# Patient Record
Sex: FEMALE
[De-identification: names, ages, dates, MRNs, and addresses within clinical notes are randomized per-mention and may not be internally consistent; named-entity substitution may affect disease eponyms.]

---

## 2018-12-29 ENCOUNTER — HOSPITAL ENCOUNTER (EMERGENCY)
Dept: HOSPITAL 14 - H.ER | Age: 48
Discharge: HOME | End: 2018-12-29
Payer: COMMERCIAL

## 2018-12-29 VITALS
TEMPERATURE: 97.6 F | HEART RATE: 76 BPM | SYSTOLIC BLOOD PRESSURE: 118 MMHG | OXYGEN SATURATION: 98 % | DIASTOLIC BLOOD PRESSURE: 76 MMHG

## 2018-12-29 VITALS — BODY MASS INDEX: 26.6 KG/M2

## 2018-12-29 VITALS — RESPIRATION RATE: 19 BRPM

## 2018-12-29 DIAGNOSIS — E03.9: ICD-10-CM

## 2018-12-29 DIAGNOSIS — R10.32: Primary | ICD-10-CM

## 2018-12-29 DIAGNOSIS — K52.9: ICD-10-CM

## 2018-12-29 LAB
ALBUMIN SERPL-MCNC: 4.3 G/DL (ref 3.5–5)
ALBUMIN/GLOB SERPL: 1.2 {RATIO} (ref 1–2.1)
ALT SERPL-CCNC: 15 U/L (ref 9–52)
AST SERPL-CCNC: 23 U/L (ref 14–36)
BASOPHILS # BLD AUTO: 0 K/UL (ref 0–0.2)
BASOPHILS NFR BLD: 1 % (ref 0–2)
BUN SERPL-MCNC: 11 MG/DL (ref 7–17)
CALCIUM SERPL-MCNC: 9.8 MG/DL (ref 8.4–10.2)
EOSINOPHIL # BLD AUTO: 0 K/UL (ref 0–0.7)
EOSINOPHIL NFR BLD: 0.5 % (ref 0–4)
ERYTHROCYTE [DISTWIDTH] IN BLOOD BY AUTOMATED COUNT: 14.5 % (ref 11.5–14.5)
GFR NON-AFRICAN AMERICAN: > 60
HGB BLD-MCNC: 13.1 G/DL (ref 12–16)
LYMPHOCYTES # BLD AUTO: 1.5 K/UL (ref 1–4.3)
LYMPHOCYTES NFR BLD AUTO: 35.2 % (ref 20–40)
MCH RBC QN AUTO: 30 PG (ref 27–31)
MCHC RBC AUTO-ENTMCNC: 32.2 G/DL (ref 33–37)
MCV RBC AUTO: 93.1 FL (ref 81–99)
MONOCYTES # BLD: 0.4 K/UL (ref 0–0.8)
MONOCYTES NFR BLD: 9.2 % (ref 0–10)
NEUTROPHILS # BLD: 2.3 K/UL (ref 1.8–7)
NEUTROPHILS NFR BLD AUTO: 54.1 % (ref 50–75)
NRBC BLD AUTO-RTO: 0.1 % (ref 0–0)
PLATELET # BLD: 164 K/UL (ref 130–400)
PMV BLD AUTO: 9.7 FL (ref 7.2–11.7)
RBC # BLD AUTO: 4.37 MIL/UL (ref 3.8–5.2)
WBC # BLD AUTO: 4.3 K/UL (ref 4.8–10.8)

## 2018-12-29 PROCEDURE — 85025 COMPLETE CBC W/AUTO DIFF WBC: CPT

## 2018-12-29 PROCEDURE — 76830 TRANSVAGINAL US NON-OB: CPT

## 2018-12-29 PROCEDURE — 74177 CT ABD & PELVIS W/CONTRAST: CPT

## 2018-12-29 PROCEDURE — 81025 URINE PREGNANCY TEST: CPT

## 2018-12-29 PROCEDURE — 80053 COMPREHEN METABOLIC PANEL: CPT

## 2018-12-29 PROCEDURE — 99284 EMERGENCY DEPT VISIT MOD MDM: CPT

## 2018-12-29 NOTE — US
Date of service: 



12/29/2018



HISTORY:

LLQ pain



COMPARISON:

None available.



TECHNIQUE:

Transabdominal/transvaginal sonographic evaluation of pelvis 

performed.



FINDINGS:



UTERUS:

Measures 5.6 x 2.9 x 2.3 cm. Normal in size and appearance. No 

fibroid or other mass lesion seen.



ENDOMETRIUM:

Measures 2.1 mm in diameter. Unremarkable. 



CERVIX:

No cervical abnormality identified.  Cervix measures 1.8 cm 



RIGHT OVARY:

Measures 2.3 x 1.9 x 1.0 cm. No solid mass. Normal flow. 



LEFT OVARY:

Measures 2.0 x 1.8 x 1.0 cm. No solid mass. Normal flow. 



FREE FLUID:

No significant free fluid noted.



OTHER FINDINGS:

None. 



IMPRESSION:

Unremarkable pelvic ultrasound.

## 2018-12-29 NOTE — ED PDOC
HPI: Abdomen


Time Seen by Provider: 12/29/18 11:16


Chief Complaint (Nursing): Abdominal Pain


Chief Complaint (Provider): Abdominal Pain


History Per: Patient


History/Exam Limitations: no limitations


Onset/Duration Of Symptoms: Days (x1)


Current Symptoms Are (Timing): Still Present


Quality Of Discomfort: Sharp


Additional Complaint(s): 


48 year old female presents to the ED for evaluation of sharp LLQ abdominal pain

since yesterday. Denies any associated nausea, vomiting, diarrhea, urinary 

symptoms, and fever.





LNMP: 2015


PMD: none provided





Past Medical History


Reviewed: Historical Data, Nursing Documentation, Vital Signs


Vital Signs: 





                                Last Vital Signs











Temp  98 F   12/29/18 11:09


 


Pulse  82   12/29/18 11:09


 


Resp  16   12/29/18 11:09


 


BP  124/80   12/29/18 11:09


 


Pulse Ox  100   12/29/18 11:09














- Medical History


PMH: Hypothyroidism





- Surgical History


Surgical History: No Surg Hx





- Family History


Family History: States: Unknown Family Hx





- Allergies


Allergies/Adverse Reactions: 


                                    Allergies











Allergy/AdvReac Type Severity Reaction Status Date / Time


 


No Known Allergies Allergy   Verified 12/29/18 11:32














Review of Systems


ROS Statement: Except As Marked, All Systems Reviewed And Found Negative


Constitutional: Negative for: Fever


Gastrointestinal: Positive for: Abdominal Pain (LLQ).  Negative for: Nausea, 

Vomiting, Diarrhea


Genitourinary Female: Negative for: Dysuria, Frequency, Incontinence





Physical Exam





- Reviewed


Nursing Documentation Reviewed: Yes


Vital Signs Reviewed: Yes





- Physical Exam


Appears: Positive for: No Acute Distress


Head Exam: Positive for: ATRAUMATIC, NORMOCEPHALIC


Skin: Positive for: Normal Color, Warm


Eye Exam: Positive for: Normal appearance


Neck: Positive for: Normal, Painless ROM, Supple


Cardiovascular/Chest: Positive for: Regular Rate, Rhythm


Respiratory: Positive for: Normal Breath Sounds.  Negative for: Respiratory 

Distress


Gastrointestinal/Abdominal: Positive for: Normal Exam, Soft, Tenderness (LLQ).  

Negative for: Mass


Back: Positive for: Normal Inspection.  Negative for: L CVA Tenderness, R CVA 

Tenderness


Extremity: Positive for: Normal ROM


Neurologic/Psych: Positive for: Alert, Oriented (x3)





- Laboratory Results


Result Diagrams: 


                                 12/29/18 12:00





                                 12/29/18 12:00





- ECG


O2 Sat by Pulse Oximetry: 100 (RA)


Pulse Ox Interpretation: Normal





Medical Decision Making


Medical Decision Making: 


Time: 1135


Initial Impression: LLQ pain, does not appear to be GI, r/o ovarian cyst


Initial Plan:


--CMP


--U-preg


--U-dip


--CBC with differential


--Transvag US





-----------------------------------------

-------------------------------------------------------------------------------


Scribe Attestation:


Documented by Mary Fulton, acting as a scribe for Mj Holman MD.


Provider Scribe Attestation:


All medical record entries made by the Scribe were at my direction and 

personally dictated by me. I have reviewed the chart and agree that the record 

accurately reflects my personal performance of the history, physical exam, 

medical decision making, and the department course for this patient. I have also

personally directed, reviewed, and agree with the discharge instructions and 

disposition.





Disposition





- Clinical Impression


Clinical Impression: 


 Abdominal pain








- Patient ED Disposition


Is Patient to be Admitted: Transfer of Care





- Disposition


Disposition: Transfer of Care


Disposition Time: 14:52


Condition: FAIR


Forms:  [employer] (English)


Patient Signed Over To: Wendy Perez

## 2018-12-29 NOTE — CT
Date of service: 



12/29/2018



PROCEDURE:  CT Abdomen and Pelvis 



HISTORY:

Abd pain



COMPARISON:

None.



TECHNIQUE:

Contiguous axial images of the abdomen and pelvis performed following 

intravenous injection of approximately 95 cc Omnipaque 300 contrast 

material.  Additional 2D sagittal and coronal reformats generated. 



Radiation dose:



Total exam DLP = 566.72 mGy-cm.



This CT exam was performed using one or more of the following dose 

reduction techniques: Automated exposure control, adjustment of the 

mA and/or kV according to patient size, and/or use of iterative 

reconstruction technique.



FINDINGS:



LOWER THORAX:

Lung bases clear.  Small hiatal hernia.



LIVER:

Liver exhibits normal size measuring approximately 14 cm in CC 

dimension.  No obvious hepatic mass collection or calcification. 



Minor fatty hepatic infiltration. 



GALLBLADDER AND BILE DUCTS:

Unremarkable. 



PANCREAS:

Unremarkable. No mass. No ductal dilatation.



SPLEEN:

Unremarkable. No splenomegaly. 



ADRENALS:

Unremarkable. 



KIDNEYS AND URETERS:

Unremarkable. No stone or hydronephrosis. 



BLADDER:

Urinary bladder incompletely distended which in part accounts for 

thick-walled appearance however the possibility of cystitis/UTI not 

excluded.  Clinical correlation with urinalysis. 



REPRODUCTIVE:

Unremarkable. 



APPENDIX:

No evidence of acute appendicitis.



BOWEL:

Evaluation of the bowel is somewhat limited due to of the lack of 

oral contrast material.



Stomach is incompletely distended with thick-walled appearance. 



Visualized loops of small bowel exhibit normal contour and caliber.  

No evidence of acute mechanical small bowel obstruction. 



Stool and air seen throughout the cecum ascending and some of the 

transverse colon.  Portions of the descending colon are collapsed 

which likely in part accounts for prominent appearing wall however 

some submucosal fat deposition or edema not completely excluded..  

There is a localized area infiltration about a short segment of the 

distal descending/proximal sigmoid colon junction that may could 

represent mild colitis or epiploica appendagitis.  While no 

significant diverticular disease is seen the possibility of a mild 

localized diverticulitis also not completely excluded.  Clinical 

correlation recommended. 



PERITONEUM:

No gross free intraperitoneal air.  No definitive evidence of 

loculated fluid or abscess collections.  The 



LYMPH NODES:

Unremarkable. No enlarged lymph nodes. 



VASCULATURE:

Unremarkable. No aortic aneurysm. No aortic atherosclerotic 

calcification or mural plaque present.



BONES:

Mild minor multilevel degenerative spondylosis of the lower thoracic 

and lumbar spine. 



OTHER FINDINGS:

None. 



IMPRESSION:

There is mild wall thickening/edema involving short segment of the 

distal descending/sigmoid colon junction with some surrounding 

infiltration.  Findings may represent a colitis.  No obvious 

diverticular disease is present though the possibility of a mild 

acute diverticulitis or appendagitis epiploica would be additional 

differential diagnostic considerations. 



Mild fatty hepatic infiltration. 



Bladder wall thickening likely due to incomplete distention however 

possibility of a cystitis also not excluded.

## 2018-12-29 NOTE — ED PDOC
- Laboratory Results


Result Diagrams: 


                                 12/29/18 12:00





                                 12/29/18 12:00





- ECG


O2 Sat by Pulse Oximetry: 100 (RA)





Medical Decision Making


Medical Decision Making: 


Time:1502


Patient endorsed to provider by Dr. Holman, pending CT.





Time:1659


Abd/Pelvix CT


FINDINGS:


LOWER THORAX:


Lung bases clear.  Small hiatal hernia.





LIVER:


Liver exhibits normal size measuring approximately 14 cm in CC dimension.  No 

obvious hepatic mass collection or calcification. 





Minor fatty hepatic infiltration. 





GALLBLADDER AND BILE DUCTS:


Unremarkable. 





PANCREAS:


Unremarkable. No mass. No ductal dilatation.





SPLEEN:


Unremarkable. No splenomegaly. 





ADRENALS:


Unremarkable. 





KIDNEYS AND URETERS:


Unremarkable. No stone or hydronephrosis. 





BLADDER:


Urinary bladder incompletely distended which in part accounts for thick-walled 

appearance however the possibility of cystitis/UTI not excluded.  Clinical 

correlation with urinalysis. 





REPRODUCTIVE:


Unremarkable. 





APPENDIX:


No evidence of acute appendicitis.





BOWEL:


Evaluation of the bowel is somewhat limited due to of the lack of oral contrast 

material.





Stomach is incompletely distended with thick-walled appearance. 





Visualized loops of small bowel exhibit normal contour and caliber.  No evidence

of acute mechanical small bowel obstruction. 





Stool and air seen throughout the cecum ascending and some of the transverse 

colon.  Portions of the descending colon are collapsed which likely in part 

accounts for prominent appearing wall however some submucosal fat deposition or 

edema not completely excluded..  There is a localized area infiltration about a 

short segment of the distal descending/proximal sigmoid colon junction that may 

could represent mild colitis or epiploica appendagitis.  While no significant 

diverticular disease is seen the possibility of a mild localized diverticulitis 

also not completely excluded.  Clinical correlation recommended. 





PERITONEUM:


No gross free intraperitoneal air.  No definitive evidence of loculated fluid or

abscess collections.  The 





LYMPH NODES:


Unremarkable. No enlarged lymph nodes. 





VASCULATURE:


Unremarkable. No aortic aneurysm. No aortic atherosclerotic calcification or 

mural plaque present.





BONES:


Mild minor multilevel degenerative spondylosis of the lower thoracic and lumbar 

spine. 





OTHER FINDINGS:


None. 





IMPRESSION:


There is mild wall thickening/edema involving short segment of the distal 

descending/sigmoid colon junction with some surrounding infiltration.  Findings 

may represent a colitis.  No obvious diverticular disease is present though the 

possibility of a mild acute diverticulitis or appendagitis epiploica would be 

additional differential diagnostic considerations. 





Mild fatty hepatic infiltration. 





Bladder wall thickening likely due to incomplete distention however possibility 

of a cystitis also not excluded.





Time:1700


Pt was eating full meal in ER. Eager to be discharged. Discussed findings and 

plan of care with patient. Additionally discussed need for follow up for 

resolution of symptoms. 


---------------------------------------------

----------------------------------------------------


Scribe Attestation:


Documented by Efrain Hudson, acting as a scribe for Wendy Perez MD.


Provider Scribe Attestation:


All medical record entries made by the Scribe were at my direction and 

personally dictated by me. I have reviewed the chart and agree that the record 

accurately reflects my personal performance of the history, physical exam, 

medical decision making, and the department course for this patient. I have also

personally directed, reviewed, and agree with the discharge instructions and 

disposition.











Disposition


Counseled Patient/Family Regarding: Studies Performed, Diagnosis, Need For 

Followup, Rx Given





- Clinical Impression


Clinical Impression: 


 Abdominal pain, Colitis








- POA


Present On Arrival: None





- Disposition


Referrals: 


Jeniffer Pereira MD [Medical Doctor] - 12/31/18


Disposition: Routine/Home


Disposition Time: 17:17


Condition: IMPROVED


Prescriptions: 


Ciprofloxacin [Cipro] 1 tab PO BID #14 tab


Ibuprofen [Motrin Tab] 600 mg PO Q8 PRN #30 tab


 PRN Reason: Pain, Moderate (4-7)


Saccharomyces Boulardi [Florastor] 500 mg PO BID #28 cap


Instructions:  Acute Abdomen (Belly Pain), Adult (DC), Colitis (DC)


Forms:  [employer] (English)

## 2019-04-13 ENCOUNTER — HOSPITAL ENCOUNTER (EMERGENCY)
Dept: HOSPITAL 14 - H.ER | Age: 49
Discharge: HOME | End: 2019-04-13
Payer: COMMERCIAL

## 2019-04-13 VITALS
SYSTOLIC BLOOD PRESSURE: 127 MMHG | TEMPERATURE: 97.6 F | HEART RATE: 78 BPM | RESPIRATION RATE: 20 BRPM | DIASTOLIC BLOOD PRESSURE: 78 MMHG

## 2019-04-13 VITALS — BODY MASS INDEX: 28.3 KG/M2

## 2019-04-13 VITALS — OXYGEN SATURATION: 98 %
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
## 2019-04-13 DIAGNOSIS — S79.911A: ICD-10-CM

## 2019-04-13 DIAGNOSIS — S39.92XA: Primary | ICD-10-CM

## 2019-04-13 PROCEDURE — 81025 URINE PREGNANCY TEST: CPT

## 2019-04-13 PROCEDURE — 73502 X-RAY EXAM HIP UNI 2-3 VIEWS: CPT

## 2019-04-13 PROCEDURE — 72114 X-RAY EXAM L-S SPINE BENDING: CPT

## 2019-04-13 PROCEDURE — 96372 THER/PROPH/DIAG INJ SC/IM: CPT

## 2019-04-13 PROCEDURE — 99282 EMERGENCY DEPT VISIT SF MDM: CPT

## 2019-04-13 PROCEDURE — 73590 X-RAY EXAM OF LOWER LEG: CPT

## 2019-04-13 NOTE — RAD
Date of service: 



04/13/2019



PROCEDURE:  Radiographs of the Lumbar Spine.



HISTORY:

back pain







COMPARISON:

No prior.



TECHNIQUE:

 5 views obtained.



FINDINGS:



BONES:

Normal alignment. No listhesis. No fracture.



DISC SPACES:

Unremarkable.



OTHER FINDINGS:

None.



IMPRESSION:

Unremarkable radiographs of the lumbar spine.

## 2019-04-13 NOTE — RAD
Date of service: 



04/13/2019



PROCEDURE:  Radiographs of the right tibia and fibula.



HISTORY:

pain



COMPARISON:

None available



TECHNIQUE:

Frontal and lateral views obtained. 2 views obtained.



FINDINGS:



BONES:

No fracture or destructive lesion.



JOINT SPACES:

Unremarkable.



OTHER FINDINGS:

None.



IMPRESSION:

Unremarkable radiographs of the right tibia and fibula.

## 2019-04-13 NOTE — RAD
PROCEDURE:  Right Hip Radiographs.



HISTORY:

 pain 



COMPARISON:

CT scan of the abdomen and pelvis dated 12/29/2018. 



TECHNIQUE:

2 views obtained.



FINDINGS:



BONES:

No acute fracture. Tiny ossicle adjacent to the superolateral right 

acetabulum. 



JOINTS:

Normal. 



SOFT TISSUES:

Normal. 



OTHER FINDINGS:

None.



IMPRESSION:

No demonstrated fracture or dislocation.

## 2019-04-13 NOTE — ED PDOC
Lower Extremity Pain/Injury


Time Seen by Provider: 04/13/19 08:03


Chief Complaint (Nursing): Lower Extremity Problem/Injury


Chief Complaint (Provider): Lower Extremity Problem/Injury


History Per: Patient


History/Exam Limitations: no limitations


Onset/Duration Of Symptoms: Persistent (x3 days)


Current Symptoms Are (Timing): Still Present


Additional Complaint(s): 


48 year old female presents to the emergency department with family at bedside, 

for an evaluation of worsening right hip pain status post mechanical fall on 

04/11/19. Patient denies headache, loss of consciousness, dizziness, chest pain,

shortness of breath, numbness or weakness. She took Aleve last night with 

minimal relief.  No dysuria, incontinence or constipation.  





PCP: Dr. Lance Gaspar





Past Medical History


Reviewed: Historical Data, Nursing Documentation, Vital Signs


Vital Signs: 





                                Last Vital Signs











Temp  98.3 F   04/13/19 08:01


 


Pulse  81   04/13/19 08:01


 


Resp  16   04/13/19 08:01


 


BP  125/76   04/13/19 08:01


 


Pulse Ox  98   04/13/19 08:05














- Medical History


PMH: Hypothyroidism





- Family History


Family History: States: Unknown Family Hx





- Home Medications


Home Medications: 


                                Ambulatory Orders











 Medication  Instructions  Recorded


 


Ciprofloxacin [Cipro] 1 tab PO BID #14 tab 12/29/18


 


Ibuprofen [Motrin Tab] 600 mg PO Q8 PRN #30 tab 12/29/18


 


Saccharomyces Boulardi [Florastor] 500 mg PO BID #28 cap 12/29/18


 


Ibuprofen [Motrin] 600 mg PO TID 7 Days  tab 04/13/19


 


Lidocaine 5% [Lidoderm] 1 ea TD DAILY PRN #5 patch 04/13/19














- Allergies


Allergies/Adverse Reactions: 


                                    Allergies











Allergy/AdvReac Type Severity Reaction Status Date / Time


 


No Known Allergies Allergy   Verified 04/13/19 08:05














Review of Systems


ROS Statement: Except As Marked, All Systems Reviewed And Found Negative


Cardiovascular: Negative for: Chest Pain


Respiratory: Negative for: Shortness of Breath


Musculoskeletal: Positive for: Leg Pain (right hip to LE)


Neurological: Negative for: Weakness, Numbness, Headache, Dizziness (or LOC)





Physical Exam





- Reviewed


Nursing Documentation Reviewed: Yes


Vital Signs Reviewed: Yes





- Physical Exam


Appears: Positive for: No Acute Distress


Head Exam: Positive for: ATRAUMATIC, NORMAL INSPECTION, NORMOCEPHALIC


Skin: Positive for: Normal Color


Eye Exam: Positive for: Normal appearance, EOMI, PERRL


Neck: Positive for: Normal, Painless ROM, Supple


Cardiovascular/Chest: Positive for: Regular Rate, Rhythm, Chest Non Tender


Respiratory: Positive for: Normal Breath Sounds.  Negative for: Respiratory 

Distress


Gastrointestinal/Abdominal: Positive for: Normal Exam, Soft.  Negative for: 

Tenderness


Back: Positive for: Muscle Spasm (right lower lateral back).  Negative for: 

Decreased ROM


Extremity: Positive for: Normal ROM (upper/lower and digits), Tenderness (right 

upper, proximal tib/fib area including knee with ecchymosis).  Negative for: 

Pedal Edema (right-sided), Deformity, Other (right ankle pain or swelling)


Neurological/Psych: Positive for: Awake, Alert, Oriented (x3), CNs II-XII.  

Negative for: Motor/Sensory Deficits





- Laboratory Results


Urine Pregnancy POC: Negative





- ECG


O2 Sat by Pulse Oximetry: 98 (RA)


Pulse Ox Interpretation: Normal





- Radiology


X-Ray: Read By Radiologist


X-Ray Interpretation: No Acute Disease





- Progress


ED Course And Treament: 





1117:  Stable.  AAOx3.  Pain free.  Tolerated po.  FU with pcp. 





Medical Decision Making


Medical Decision Making: 


Time: 0822


Initial Plan:


* XR lumbar spine


* XR hip (right)


* XR tib/fib (right)


* Lidoderm


* Toradol IM





------

--------------------------------------------------------------------------------


-----------


Scribe Attestation:


Documented by Nichole Desai, acting as a scribe for Huey Armijo MD.


Provider Scribe Attestation:


All medical record entries made by the Scribe were at my direction and 

personally dictated by me. I have reviewed the chart and agree that the record 

accurately reflects my personal performance of the history, physical exam, 

medical decision making, and the department course for this patient. I have also

 personally directed, reviewed, and agree with the discharge instructions and 

disposition.





Disposition





- Clinical Impression


Clinical Impression: 


 Hip injury, Back injury








- Patient ED Disposition


Is Patient to be Admitted: No


Counseled Patient/Family Regarding: Studies Performed, Diagnosis, Need For 

Followup, Rx Given





- Disposition


Referrals: 


[facility] [Outside] - 04/15/19


Disposition: Routine/Home


Disposition Time: 09:00


Condition: STABLE


Additional Instructions: 


Return if not better in 3 days. 


Prescriptions: 


Ibuprofen [Motrin] 600 mg PO TID 7 Days  tab


Lidocaine 5% [Lidoderm] 1 ea TD DAILY PRN #5 patch


 PRN Reason: Pain, Moderate (4-7)


Instructions:  Low Back Pain in Adults, Hip Pain (DC)


Forms:  [employer] (English)


Print Language: [place]ian